# Patient Record
Sex: MALE | Race: WHITE
[De-identification: names, ages, dates, MRNs, and addresses within clinical notes are randomized per-mention and may not be internally consistent; named-entity substitution may affect disease eponyms.]

---

## 2019-01-16 NOTE — PREOPHP
DATE OF ADMISSION: 01/17/2019

 

He is having an open irrigation and debridement right hip probable infection various surgery to be do
ne by Dr. Dorsey.

 

HISTORY OF PRESENT ILLNESS:  The patient is a 68-year-old male who underwent a right total hip replac
ement on 07/05/2018.  He developed possible wound infection with a sinus tract.  He had I and D done 
at [facility] center in 11/15/2018.  He was treated with Bactrim and Keflex and then subsequent to t
hat, he had another 3 to 4-week course of Keflex 500 mg t.i.d.  At this time, it was opted to do open
 process to drain in the possible infection.  He had a CT scan of the soft tissues of the hip that sh
ows a possible communication regarding the skin and the hip prosthesis.

 

MEDICAL PROBLEMS:  Include:

1.  Hypertension on Exforge 10/320 daily.

2.  Aspirin 81 mg daily.  No cardiac, no CNS complaints.

3.  BPH, on Flomax 0.4 two at bedtime and Myrbetriq 25 mg at bedtime.

4.  Osteoarthritis, status post multiple orthopedic procedures in the past which included right knee 
contusions ____ laminectomy L4 to L5 on 08/2014, right knee arthroscopy in 1985, right total hip repl
acement just recently.  He takes only aspirin for p.r.n. pain.

5.  Chronic sinusitis and vertigo relatively quiescent.

6.  He is nonsmoker.  He quit smoking 43 years ago.

7.  Elevated blood sugars between 100 and 105.  Likely, hemoglobin has been between 5.5 to 5.7.

8.  Moderate obesity, mostly related to increased caloric intake ____.

9.  Elevated cholesterol and triglycerides.  He has refused taking medication.  For quite some time, 
the collection is not really that high.

10.  Vitamin D deficiency, in 3000 units daily.

 

ALLERGIES:  THE PATIENT IS ALLERGIC TO NO MEDICATIONS.

 

SOCIAL HISTORY:  He quit smoking 43 years ago.  He does not drink much alcohol.  Coffee 1 to 2 cups a
 day.

 

PRIOR SURGERIES:  Tonsillectomy age 4 or 5, laminectomy in 08/2014, right knee surgery in 1985, vasec
cristiano at age 28, septoplasty in 1994.

 

FAMILY HISTORY:  Noncontributory.

 

REVIEW OF SYSTEMS:  Noncontributory.

 

PHYSICAL EXAMINATION:

VITAL SIGNS:  Blood pressure was 134/80, pulse 80, respirations 20.

GENERAL:  Well-developed, well-nourished male in no acute distress.

HEENT:  Head is normocephalic, atraumatic.  Eyes:  Conjunctivae are clear.  Fundi benign.  ENT exam u
nremarkable.

NECK:  Supple.  Full range of motion.  No thyromegaly or adenopathy.  Carotid pulses are 2+ equal and
 symmetrical.

BACK:  No spine or CVA tenderness.

LUNGS:  Clear to percussion and auscultation.

COR:  Regular rate and rhythm.  No gallops.  No murmurs.

GASTROINTESTINAL:  Abdomen soft, nontender, nondistended.  No masses, no hepatosplenomegaly.  Bowel s
ounds are normoactive.

RECTAL:  Done recently in the office and unremarkable.

EXTREMITIES:  No clubbing, cyanosis or edema.

NEUROLOGICAL:  Grossly physiological.

 

LABORATORY DATA:  Done.  Chemistries were unremarkable.  His blood sugar was 102.  Pro time and PTT w
ere normal.  CBC was normal as well.  The urine was clear.

 

DIAGNOSTIC DATA:  His EKG shows normal sinus rhythm.  No ST-T segment changes.  Chest x-ray was cale
l.

 

The patient is clear for this surgery to be done by Dr. Dorsey.

 

DR. SRHEYAS GALAN DICATING PRE-OP HISTORY AND PHYSICAL FOR DR. GONZÁLEZ DORSEY.

 

 

Dictated By: GONZÁLEZ DORSEY MD

 

CG/AMERICA

DD:    01/14/2019 12:23:24

DT:    01/14/2019 12:56:53

Conf#: [id number]

DID#:  [id number]

CC: GONZÁLEZ DORSEY MD;*EndCC*

## 2019-01-17 ENCOUNTER — HOSPITAL ENCOUNTER (INPATIENT)
Dept: HOSPITAL 91 - REC | Age: 69
LOS: 1 days | Discharge: HOME | DRG: 465 | End: 2019-01-18
Payer: MEDICARE

## 2019-01-17 ENCOUNTER — HOSPITAL ENCOUNTER (INPATIENT)
Dept: HOSPITAL 10 - REC | Age: 69
LOS: 1 days | Discharge: HOME | DRG: 465 | End: 2019-01-18
Attending: ORTHOPAEDIC SURGERY | Admitting: ORTHOPAEDIC SURGERY
Payer: MEDICARE

## 2019-01-17 VITALS — SYSTOLIC BLOOD PRESSURE: 142 MMHG | DIASTOLIC BLOOD PRESSURE: 72 MMHG | RESPIRATION RATE: 13 BRPM | HEART RATE: 92 BPM

## 2019-01-17 VITALS — HEART RATE: 92 BPM | DIASTOLIC BLOOD PRESSURE: 72 MMHG | RESPIRATION RATE: 14 BRPM | SYSTOLIC BLOOD PRESSURE: 120 MMHG

## 2019-01-17 VITALS — DIASTOLIC BLOOD PRESSURE: 71 MMHG | HEART RATE: 92 BPM | SYSTOLIC BLOOD PRESSURE: 133 MMHG | RESPIRATION RATE: 19 BRPM

## 2019-01-17 VITALS — RESPIRATION RATE: 12 BRPM | HEART RATE: 92 BPM | DIASTOLIC BLOOD PRESSURE: 69 MMHG | SYSTOLIC BLOOD PRESSURE: 120 MMHG

## 2019-01-17 VITALS — RESPIRATION RATE: 18 BRPM | HEART RATE: 77 BPM | SYSTOLIC BLOOD PRESSURE: 126 MMHG | DIASTOLIC BLOOD PRESSURE: 69 MMHG

## 2019-01-17 VITALS — RESPIRATION RATE: 16 BRPM | SYSTOLIC BLOOD PRESSURE: 128 MMHG | HEART RATE: 88 BPM | DIASTOLIC BLOOD PRESSURE: 70 MMHG

## 2019-01-17 VITALS — DIASTOLIC BLOOD PRESSURE: 66 MMHG | RESPIRATION RATE: 14 BRPM | HEART RATE: 94 BPM | SYSTOLIC BLOOD PRESSURE: 129 MMHG

## 2019-01-17 VITALS — HEART RATE: 76 BPM | SYSTOLIC BLOOD PRESSURE: 125 MMHG | DIASTOLIC BLOOD PRESSURE: 66 MMHG | RESPIRATION RATE: 18 BRPM

## 2019-01-17 VITALS — HEART RATE: 102 BPM | DIASTOLIC BLOOD PRESSURE: 66 MMHG | RESPIRATION RATE: 19 BRPM | SYSTOLIC BLOOD PRESSURE: 121 MMHG

## 2019-01-17 VITALS — RESPIRATION RATE: 23 BRPM | DIASTOLIC BLOOD PRESSURE: 63 MMHG | HEART RATE: 100 BPM | SYSTOLIC BLOOD PRESSURE: 125 MMHG

## 2019-01-17 VITALS — SYSTOLIC BLOOD PRESSURE: 131 MMHG | DIASTOLIC BLOOD PRESSURE: 72 MMHG | RESPIRATION RATE: 13 BRPM | HEART RATE: 86 BPM

## 2019-01-17 VITALS — DIASTOLIC BLOOD PRESSURE: 65 MMHG | RESPIRATION RATE: 15 BRPM | SYSTOLIC BLOOD PRESSURE: 121 MMHG | HEART RATE: 96 BPM

## 2019-01-17 VITALS — SYSTOLIC BLOOD PRESSURE: 128 MMHG | HEART RATE: 88 BPM | DIASTOLIC BLOOD PRESSURE: 68 MMHG | RESPIRATION RATE: 20 BRPM

## 2019-01-17 VITALS — HEART RATE: 86 BPM | RESPIRATION RATE: 13 BRPM | DIASTOLIC BLOOD PRESSURE: 72 MMHG | SYSTOLIC BLOOD PRESSURE: 126 MMHG

## 2019-01-17 VITALS — HEART RATE: 86 BPM | RESPIRATION RATE: 16 BRPM | DIASTOLIC BLOOD PRESSURE: 73 MMHG | SYSTOLIC BLOOD PRESSURE: 136 MMHG

## 2019-01-17 VITALS — SYSTOLIC BLOOD PRESSURE: 119 MMHG | HEART RATE: 100 BPM | RESPIRATION RATE: 17 BRPM | DIASTOLIC BLOOD PRESSURE: 58 MMHG

## 2019-01-17 VITALS
WEIGHT: 231.26 LBS | HEIGHT: 71.5 IN | WEIGHT: 231.26 LBS | BODY MASS INDEX: 31.67 KG/M2 | HEIGHT: 71.5 IN | BODY MASS INDEX: 31.67 KG/M2

## 2019-01-17 VITALS — HEART RATE: 81 BPM | DIASTOLIC BLOOD PRESSURE: 70 MMHG | RESPIRATION RATE: 14 BRPM | SYSTOLIC BLOOD PRESSURE: 125 MMHG

## 2019-01-17 VITALS — HEART RATE: 92 BPM | DIASTOLIC BLOOD PRESSURE: 57 MMHG | SYSTOLIC BLOOD PRESSURE: 125 MMHG | RESPIRATION RATE: 17 BRPM

## 2019-01-17 VITALS — RESPIRATION RATE: 16 BRPM | SYSTOLIC BLOOD PRESSURE: 139 MMHG | HEART RATE: 82 BPM | DIASTOLIC BLOOD PRESSURE: 72 MMHG

## 2019-01-17 VITALS — SYSTOLIC BLOOD PRESSURE: 130 MMHG | HEART RATE: 82 BPM | RESPIRATION RATE: 20 BRPM | DIASTOLIC BLOOD PRESSURE: 72 MMHG

## 2019-01-17 VITALS — RESPIRATION RATE: 18 BRPM | DIASTOLIC BLOOD PRESSURE: 66 MMHG | HEART RATE: 90 BPM | SYSTOLIC BLOOD PRESSURE: 121 MMHG

## 2019-01-17 VITALS — HEART RATE: 98 BPM | SYSTOLIC BLOOD PRESSURE: 126 MMHG | RESPIRATION RATE: 14 BRPM | DIASTOLIC BLOOD PRESSURE: 75 MMHG

## 2019-01-17 VITALS — HEART RATE: 86 BPM | SYSTOLIC BLOOD PRESSURE: 121 MMHG | DIASTOLIC BLOOD PRESSURE: 68 MMHG | RESPIRATION RATE: 14 BRPM

## 2019-01-17 VITALS — SYSTOLIC BLOOD PRESSURE: 133 MMHG | HEART RATE: 84 BPM | DIASTOLIC BLOOD PRESSURE: 74 MMHG | RESPIRATION RATE: 20 BRPM

## 2019-01-17 VITALS — DIASTOLIC BLOOD PRESSURE: 68 MMHG | HEART RATE: 88 BPM | RESPIRATION RATE: 20 BRPM | SYSTOLIC BLOOD PRESSURE: 130 MMHG

## 2019-01-17 VITALS — DIASTOLIC BLOOD PRESSURE: 71 MMHG | SYSTOLIC BLOOD PRESSURE: 115 MMHG | RESPIRATION RATE: 20 BRPM

## 2019-01-17 VITALS — DIASTOLIC BLOOD PRESSURE: 65 MMHG | SYSTOLIC BLOOD PRESSURE: 119 MMHG | HEART RATE: 92 BPM | RESPIRATION RATE: 15 BRPM

## 2019-01-17 VITALS — HEART RATE: 86 BPM | DIASTOLIC BLOOD PRESSURE: 72 MMHG | SYSTOLIC BLOOD PRESSURE: 116 MMHG | RESPIRATION RATE: 13 BRPM

## 2019-01-17 VITALS — DIASTOLIC BLOOD PRESSURE: 65 MMHG | RESPIRATION RATE: 15 BRPM | SYSTOLIC BLOOD PRESSURE: 119 MMHG | HEART RATE: 90 BPM

## 2019-01-17 VITALS — HEART RATE: 92 BPM | DIASTOLIC BLOOD PRESSURE: 57 MMHG | SYSTOLIC BLOOD PRESSURE: 114 MMHG | RESPIRATION RATE: 17 BRPM

## 2019-01-17 VITALS — SYSTOLIC BLOOD PRESSURE: 128 MMHG | HEART RATE: 86 BPM | RESPIRATION RATE: 21 BRPM | DIASTOLIC BLOOD PRESSURE: 72 MMHG

## 2019-01-17 VITALS — HEART RATE: 88 BPM | SYSTOLIC BLOOD PRESSURE: 128 MMHG | DIASTOLIC BLOOD PRESSURE: 77 MMHG | RESPIRATION RATE: 18 BRPM

## 2019-01-17 VITALS — DIASTOLIC BLOOD PRESSURE: 70 MMHG | RESPIRATION RATE: 19 BRPM | HEART RATE: 80 BPM | SYSTOLIC BLOOD PRESSURE: 128 MMHG

## 2019-01-17 DIAGNOSIS — T84.51XA: Primary | ICD-10-CM

## 2019-01-17 DIAGNOSIS — E56.1: ICD-10-CM

## 2019-01-17 DIAGNOSIS — I10: ICD-10-CM

## 2019-01-17 DIAGNOSIS — E66.9: ICD-10-CM

## 2019-01-17 DIAGNOSIS — N40.0: ICD-10-CM

## 2019-01-17 PROCEDURE — 87075 CULTR BACTERIA EXCEPT BLOOD: CPT

## 2019-01-17 PROCEDURE — 0JBL0ZZ EXCISION OF RIGHT UPPER LEG SUBCUTANEOUS TISSUE AND FASCIA, OPEN APPROACH: ICD-10-PCS | Performed by: ORTHOPAEDIC SURGERY

## 2019-01-17 PROCEDURE — 87070 CULTURE OTHR SPECIMN AEROBIC: CPT

## 2019-01-17 PROCEDURE — 73530: CPT

## 2019-01-17 PROCEDURE — 0JBL0ZZ EXCISION OF RIGHT UPPER LEG SUBCUTANEOUS TISSUE AND FASCIA, OPEN APPROACH: ICD-10-PCS

## 2019-01-17 PROCEDURE — 86999 UNLISTED TRANSFUSION MED PX: CPT

## 2019-01-17 RX ADMIN — GABAPENTIN 1 MG: 300 CAPSULE ORAL at 10:46

## 2019-01-17 RX ADMIN — PYRIDOXINE HYDROCHLORIDE 1 MLS/HR: 100 INJECTION, SOLUTION INTRAMUSCULAR; INTRAVENOUS at 20:34

## 2019-01-17 RX ADMIN — Medication 1 MLS/HR: at 15:44

## 2019-01-17 RX ADMIN — CEFAZOLIN SCH MLS/HR: 1 INJECTION, POWDER, FOR SOLUTION INTRAMUSCULAR; INTRAVENOUS at 23:06

## 2019-01-17 RX ADMIN — VASOPRESSIN 1 ML: 20 INJECTION, SOLUTION INTRAMUSCULAR; SUBCUTANEOUS at 19:45

## 2019-01-17 RX ADMIN — PYRIDOXINE HYDROCHLORIDE SCH MLS/HR: 100 INJECTION, SOLUTION INTRAMUSCULAR; INTRAVENOUS at 20:34

## 2019-01-17 RX ADMIN — Medication SCH MLS/HR: at 23:06

## 2019-01-17 RX ADMIN — PYRIDOXINE HYDROCHLORIDE SCH MLS/HR: 100 INJECTION, SOLUTION INTRAMUSCULAR; INTRAVENOUS at 23:59

## 2019-01-17 RX ADMIN — Medication 1 MLS/HR: at 23:06

## 2019-01-17 RX ADMIN — Medication SCH MLS/HR: at 15:44

## 2019-01-17 RX ADMIN — CEFAZOLIN 1 MLS/HR: 1 INJECTION, POWDER, FOR SOLUTION INTRAMUSCULAR; INTRAVENOUS at 23:06

## 2019-01-17 RX ADMIN — DOCUSATE SODIUM AND SENNOSIDES 1 TAB: 8.6; 5 TABLET, FILM COATED ORAL at 20:31

## 2019-01-17 RX ADMIN — CEFAZOLIN SODIUM 1 MLS/HR: 2 SOLUTION INTRAVENOUS at 19:45

## 2019-01-17 RX ADMIN — TAMSULOSIN HYDROCHLORIDE 1 MG: 0.4 CAPSULE ORAL at 20:31

## 2019-01-17 RX ADMIN — TRANEXAMIC ACID 1 MLS/HR: 100 INJECTION, SOLUTION INTRAVENOUS at 19:45

## 2019-01-17 RX ADMIN — CEFAZOLIN 1 MLS/HR: 1 INJECTION, POWDER, FOR SOLUTION INTRAMUSCULAR; INTRAVENOUS at 15:23

## 2019-01-17 RX ADMIN — PYRIDOXINE HYDROCHLORIDE 1 MLS/HR: 100 INJECTION, SOLUTION INTRAMUSCULAR; INTRAVENOUS at 23:59

## 2019-01-17 RX ADMIN — GABAPENTIN 1 MG: 300 CAPSULE ORAL at 20:31

## 2019-01-17 RX ADMIN — CEFAZOLIN SCH MLS/HR: 1 INJECTION, POWDER, FOR SOLUTION INTRAMUSCULAR; INTRAVENOUS at 15:23

## 2019-01-17 NOTE — PREAC
Date/Time of Note


Date/Time of Note


DATE: 1/17/19 


TIME: 12:35





Anesthesia Eval and Record


Evaluation


Time Pre-Procedure Interview


DATE: 1/17/19 


TIME: 12:35


Age


68


Sex


male


NPO:  8 hrs


Preoperative diagnosis


hip infection


Planned procedure


right hip open debridement





Past Medical History


Past Medical History:  Includes


Cardio:  HTN





Surgery & Anesthesia Issues


No known issue





Meds


Anticoagulation:  No


Beta Blocker within 24 hr:  No


Reason Beta Blocker not given:  Pt. not on B-Blocker


Reported Medications


Cephalexin* (Cephalexin*) 500 Mg Capsule, 500 MG PO BID, #28 CAP


   STARTED 1-4-19 FOR 14 DAYS


   1/17/19


Mirabegron (Myrbetriq) 50 Mg Tab.er.24h, 50 MG PO DAILY, TAB


   1/17/19


Amlodipine/Valsartan (Amlodipine-Valsartan  mg) 1 Each Tablet, 1 TAB PO 


QPM, #30 TAB


   1/17/19


Tamsulosin Hcl* (Tamsulosin Hcl*) 0.4 Mg Cap.er.24h, 0.8 MG PO HS, CAP


   7/2/18


Discontinued Reported Medications


Oxybutynin Chloride* (Ditropan* XL) 5 Mg Tabsr, 5 MG PO DAILY, TAB.SA


   7/2/18


Amlodipine/Valsartan (Exforge  mg Tablet) 1 Each Tablet, 1 EACH PO, TAB


   7/2/18


Discontinued Scripts


Naproxen* (Naproxen*) 500 Mg Tablet, 500 MG PO BID PRN for PAIN, #20 TAB


   Prov:BEAR VILLALOBOS DO         7/24/18


Cephalexin* (Keflex*) 500 Mg Capsule, 500 MG PO QID for 5 Days, CAP


   Prov:BEAR VILLALOBOS DO         7/24/18





Current Medications


Bupivacaine HCl/ Morphine Sulfate/ Epinephrine/ Ketorolac Tromethamine/ 


Clonidine/Sodium Chloride/ Vancomycin HCl  INTRA-OP IRR ;  Start 1/17/19 at 


06:30;  Stop 1/17/19 at 20:00


Meds reviewed:  Yes





Allergies


Coded Allergies:  


     sulfamethoxazole (Verified  Adverse Reaction, Intermediate, nausea, 


1/17/19)


     trimethoprim (Verified  Adverse Reaction, Intermediate, nausea, 1/17/19)


Allergies Reviewed:  Yes





Labs/Studies


Labs Reviewed:  Reviewed by anesthesiologist


Pregnancy test:  N/A





Pre-procedure Exam


Last vitals





Vital Signs


  Date      Temp  Pulse  Resp  B/P (MAP)   Pulse Ox  O2          O2 Flow    FiO2


Time                                                 Delivery    Rate


   1/17/19  97.1     82    16      139/72        98  Room Air


     10:50                           (94)





Airway:  Adequate mouth opening, Adequate thyromental dist


Mallampati:  Mallampati IV


Teeth:  Normal


Lung:  Normal


Heart:  Normal





ASA Physical Status


ASA physical status:  2


Emergency:  None ( )





Pre-operative Attestations


Prior to commencing anesthesia and surgery, the patient was re-evaluated, there 


was verification of:


*The patient's identity


*The results of appropriate recent lab work and preoperative vital signs


*The above evaluation not changing prior to induction


*Anesthetic plan, risk benefits, alternative and complications discussed with 


patient/family; questions answered; patient/family understands, accepts and 


wishes to proceed.











YAO ANDINO DO             Jan 17, 2019 12:38

## 2019-01-17 NOTE — HPN
Date/Time of Note


Date/Time of Note


DATE: 1/17/19 


TIME: 06:02





Interval H&P Admission Note


Pt. seen H&P reviewed:  No system changes











GONZÁLEZ DORSEY MD            Jan 17, 2019 06:02

## 2019-01-17 NOTE — PAC
Date/Time of Note


Date/Time of Note


DATE: 1/17/19 


TIME: 14:51





Post-Anesthesia Notes


Post-Anesthesia Note


Last documented vital signs





Vital Signs


  Date      Temp  Pulse  Resp  B/P (MAP)  Pulse Ox  O2          O2 Flow     FiO2


Time                                                Delivery    Rate


   1/17/19    98     75    16     135/69        98  Room Air


      1445





Activity:  WNL


Respiratory function:  WNL


Cardiovascular function:  WNL


Mental status:  Baseline


Pain reasonably controlled:  Yes


Hydration appropriate:  Yes


Nausea/Vomiting absent:  Yes











YAO ANDINO DO             Jan 17, 2019 14:52

## 2019-01-17 NOTE — OPR
Date/Time of Note


Date/Time of Note


DATE: 1/17/19 


TIME: 06:03





Operative Report


Procedure Date:  Jan 17, 2019


Preoperative Diagnosis


Drainage from right hip


Postoperative Diagnosis


1.  Inflammation and drainage right hip status post total hip replacement


Operation/Procedure Performed


1.  Open debridement of the right hip with arthrotomy


2.  Application of PRP


Surgeon


see signature line


Assistant


Umesh Fischer DO


Second Assistant:  LEANDRA ONEILL PA-C


Anesthesia Type:  general


Estimated Blood Loss:  50 - 100 ml's


Transfusion


   none


Specimen


Multiple cultures


Grafts/Implants


none


Complications


none


Pt Condition Post Procedure:  stable


Disposition:  PACU


Procedure Description





ASSISTANT SURGEON: Umesh Fischer DO was asked to be present at my request as


a result of the complexity associated with this procedure including positioning 


of the extremity, positioning of the instrumentation and protection of the 


neurovascular structures.  In my opinion, the assistance offered by a surgical 


scrub tech is insufficient and he should be compensated for his time.





PROCEDURE IN DETAIL: Following the administration of general endotracheal 


anesthesia supplemented with a local anesthetic, the patient was placed in the 


supine position.  The right lower extremity in the area of the prior incision 


was then sterilely prepped and draped.





The right forearm was prepped and 60 cc of blood were drawn using a 60 cc 


syringe coated with anticoagulant.  The blood was harvested from the patient and


given to the representative who prepared PRP concentrate.Sterile prep and drape 


was then undertaken.  The prior incision was then made exposing the tensor 


fascia the fascia was incised the tensor was retracted laterally.  At this 


point, a pocket was noted distally and slightly medially.  This area was 


elevated and necrotic tissue was then debrided from this area.  2 cultures were 


obtained and sent.  They were labeled #1 and #2.  The devitalized tissue was 


then debrided and pulsatile lavage was then used.  The pocket went medially but 


not distally and not proximally into the hip joint.





Following the thorough debridement, the previously prepared PRP solution along 


with 2 g of vancomycin powder was then instilled in the pocket and the area of 


the debridement.  Wound was closed using 0 Mono filament suture as well as 2-0 


monofilament suture followed by a Prenio for the final cover.  This was 


watertight.  Estimated blood loss was procedure was 50 cc.











GONZÁLEZ DORSEY MD            Jan 17, 2019 06:05

## 2019-01-18 VITALS — HEART RATE: 75 BPM | RESPIRATION RATE: 18 BRPM | SYSTOLIC BLOOD PRESSURE: 119 MMHG | DIASTOLIC BLOOD PRESSURE: 60 MMHG

## 2019-01-18 VITALS — HEART RATE: 83 BPM | SYSTOLIC BLOOD PRESSURE: 122 MMHG | RESPIRATION RATE: 18 BRPM | DIASTOLIC BLOOD PRESSURE: 64 MMHG

## 2019-01-18 VITALS — SYSTOLIC BLOOD PRESSURE: 117 MMHG | RESPIRATION RATE: 17 BRPM | DIASTOLIC BLOOD PRESSURE: 59 MMHG | HEART RATE: 72 BPM

## 2019-01-18 RX ADMIN — Medication 1 MLS/HR: at 05:26

## 2019-01-18 RX ADMIN — CEFAZOLIN 1 MLS/HR: 1 INJECTION, POWDER, FOR SOLUTION INTRAMUSCULAR; INTRAVENOUS at 05:26

## 2019-01-18 RX ADMIN — Medication SCH MLS/HR: at 05:26

## 2019-01-18 RX ADMIN — CEFAZOLIN SCH MLS/HR: 1 INJECTION, POWDER, FOR SOLUTION INTRAMUSCULAR; INTRAVENOUS at 05:26

## 2019-01-18 NOTE — DS
Date/Time of Note


Date/Time of Note


DATE: 1/18/19 


TIME: 05:59





Discharge Summary


Admission/Discharge Info


Admit Date/Time


Jan 17, 2019 at 09:40


Discharge Date/Time





Discharge Diagnosis


Hip infection


Patient Condition:  Good


Hospital Course


Admitted and underwent uncomplicated procedure.  Postop day 1 discharge home 


after therapy


Home Meds


Reported Medications


Cephalexin* (Cephalexin*) 500 Mg Capsule, 500 MG PO BID, #28 CAP


   STARTED 1-4-19 FOR 14 DAYS


   1/17/19


Mirabegron (Myrbetriq) 50 Mg Tab.er.24h, 50 MG PO DAILY, TAB


   1/17/19


Amlodipine/Valsartan (Amlodipine-Valsartan  mg) 1 Each Tablet, 1 TAB PO 


QPM, #30 TAB


   1/17/19


Tamsulosin Hcl* (Tamsulosin Hcl*) 0.4 Mg Cap.er.24h, 0.8 MG PO HS, CAP


   7/2/18


Discontinued Reported Medications


Oxybutynin Chloride* (Ditropan* XL) 5 Mg Tabsr, 5 MG PO DAILY, TAB.SA


   7/2/18


Amlodipine/Valsartan (Exforge  mg Tablet) 1 Each Tablet, 1 EACH PO, TAB


   7/2/18


Discontinued Scripts


Naproxen* (Naproxen*) 500 Mg Tablet, 500 MG PO BID PRN for PAIN, #20 TAB


   Prov:BEAR VILLALOBOS DO         7/24/18


Cephalexin* (Keflex*) 500 Mg Capsule, 500 MG PO QID for 5 Days, CAP


   Prov:BEAR VILLALOBOS DO         7/24/18


Follow-up Plan


2 weeks


Primary Care Provider


GONZÁLEZ Turcios MD            Jan 18, 2019 05:59 .

## 2019-01-18 NOTE — PDOCDIS
Discharge Instructions


DIAGNOSIS


Discharge Diagnosis


Hip infection





CONDITION


                 [id number]
Patient Condition:  [id number]
Good








HOME CARE INSTRUCTIONS:


                [id number]
Diet Instructions:  [id number]
Regular








ACTIVITY:


     [id number]
Activity Restrictions:  [id number]
Slowly Increase Activity


     [id number]
Bathing Restrictions:   [id number]
Shower








FOLLOW UP/APPOINTMENTS


Follow-up Plan


2 weeks





SCHOOL/WORK RELEASE


May return to School/Work with:  With Restrictions


School/Work Release Comment:  Ambulation with crutches as necessary











GONZÁLEZ DORSEY MD            Jan 18, 2019 05:59

## 2019-01-18 NOTE — PN
Date/Time of Note


Date/Time of Note


DATE: 1/18/19 


TIME: 05:58





Subjective


Awake and alert with no discomfort





Objective


Vitals





Vital Signs


  Date      Temp  Pulse  Resp  B/P (MAP)   Pulse Ox  O2          O2 Flow    FiO2


Time                                                 Delivery    Rate


   1/18/19  98.1     72    17      117/59        96  Nasal             2.0


     02:00                           (78)            Cannula








Intake and Output





1/17/19 1/17/19 1/18/19





1515:00


23:00


07:00





IntakeIntake Total


600 ml


450 ml





OutputOutput Total


250 ml


700 ml





BalanceBalance


350 ml


-250 ml











Wound clean and dry.  Neurologically intact.  No signs of DVT.





Medications


Medications





Current Medications


Tamsulosin HCl (Flomax) 0.8 mg HS PO  Last administered on 1/17/19at 20:31; 


Admin Dose 0.8 MG;  Start 1/17/19 at 21:00


Miscellaneous Information 1 tab QPM PO ;  Start 1/17/19 at 21:00;  Status UNV


Miscellaneous Information 50 mg DAILY PO ;  Start 1/18/19 at 09:00;  Status UNV


Lactated Ringer's 1,000 ml @  100 mls/hr Q10H IV  Last administered on 1/17/19at


20:34; Admin Dose 100 MLS/HR;  Start 1/17/19 at 13:59


Cefazolin Sodium 50 ml @  100 mls/hr Q8H IVPB  Last administered on 1/18/19at 


05:26; Admin Dose 100 MLS/HR;  Start 1/17/19 at 14:00;  Stop 1/18/19 at 06:29


Senna/Docusate Sodium (Senokot-S) 1 tab BID PO  Last administered on 1/17/19at 


20:31; Admin Dose 1 TAB;  Start 1/17/19 at 21:00


Simethicone (Mylicon) 80 mg TID  PRN PO DISTENSION/GAS/BLOATING;  Start 1/17/19 


at 14:00


Magnesium Hydroxide (Milk Of Mag) 30 ml BID  PRN PO CONSTIPATION;  Start 1/17/19


at 14:00


Magnesium Hydroxide (Milk Of Mag) 30 ml HS PO ;  Start 1/19/19 at 21:00


Dexamethasone (Decadron) 2 mg Q6 PO  Last administered on 1/18/19at 05:25; Admin


Dose 2 MG;  Start 1/17/19 at 18:00;  Stop 1/18/19 at 12:01


Gabapentin (Neurontin) 300 mg HS PO  Last administered on 1/17/19at 20:31; Admin


Dose 300 MG;  Start 1/17/19 at 21:00


Acetaminophen 100 ml @  400 mls/hr Q8H IVPB  Last administered on 1/18/19at 


05:26; Admin Dose 400 MLS/HR;  Start 1/17/19 at 14:00;  Stop 1/18/19 at 06:14


Oxycodone HCl (Roxicodone) 15 mg Q4H  PRN PO PAIN;  Start 1/17/19 at 14:00


Oxycodone HCl (Roxicodone) 10 mg Q4H  PRN PO PAIN;  Start 1/17/19 at 14:00


Oxycodone HCl (Roxicodone) 5 mg Q4H  PRN PO PAIN;  Start 1/17/19 at 14:00


Hydromorphone HCl (Dilaudid) 1 mg Q4H  PRN IV BREAKTHROUGH PAIN;  Start 1/17/19 


at 14:00


Ondansetron HCl (Zofran Inj) 4 mg Q6H  PRN IV NAUSEA AND/OR VOMITING;  Start 


1/17/19 at 14:00


Diphenhydramine HCl (Benadryl) 25 mg Q6H  PRN IV PRURITUS;  Start 1/17/19 at 


14:00


Zolpidem Tartrate (Ambien) 10 mg HS  PRN PO INSOMNIA;  Start 1/17/19 at 14:00


IV Flush (NS 3 ml) 3 ml per protocol IV ;  Start 1/17/19 at 14:00


Aspirin (Ecotrin) 325 mg DAILY PO ;  Start 1/18/19 at 09:00





VTE Prophylaxis


Risk score (from Nsg)>0 risk:  6


SCD applied (from Nsg):  Yes





Lines/Catheters


IV Catheter Type:  Saline Lock


Guevara in Place:  No





Assessment/Plan


Assessment/Plan


Assessment: Status post I&D


Plan: Discharge home after ambulation this morning











GONZÁLEZ DORSEY MD            Jan 18, 2019 05:58